# Patient Record
Sex: MALE | Race: BLACK OR AFRICAN AMERICAN | Employment: FULL TIME | URBAN - METROPOLITAN AREA
[De-identification: names, ages, dates, MRNs, and addresses within clinical notes are randomized per-mention and may not be internally consistent; named-entity substitution may affect disease eponyms.]

---

## 2019-06-06 ENCOUNTER — HOSPITAL ENCOUNTER (EMERGENCY)
Age: 39
Discharge: HOME | End: 2019-06-06
Attending: SPECIALIST
Payer: MEDICAID

## 2019-06-06 VITALS
WEIGHT: 315 LBS | DIASTOLIC BLOOD PRESSURE: 98 MMHG | SYSTOLIC BLOOD PRESSURE: 151 MMHG | HEART RATE: 60 BPM | RESPIRATION RATE: 16 BRPM | OXYGEN SATURATION: 100 % | BODY MASS INDEX: 44.1 KG/M2 | TEMPERATURE: 97.7 F | HEIGHT: 71 IN

## 2019-06-06 DIAGNOSIS — L03.316 CELLULITIS, UMBILICAL: Primary | ICD-10-CM

## 2019-06-06 LAB
CHP ED QC CHECK: YES
GLUCOSE BLD-MCNC: 71 MG/DL
GLUCOSE BLD-MCNC: 71 MG/DL (ref 75–110)

## 2019-06-06 PROCEDURE — 6370000000 HC RX 637 (ALT 250 FOR IP): Performed by: PHYSICIAN ASSISTANT

## 2019-06-06 PROCEDURE — 99283 EMERGENCY DEPT VISIT LOW MDM: CPT

## 2019-06-06 PROCEDURE — 6360000002 HC RX W HCPCS: Performed by: PHYSICIAN ASSISTANT

## 2019-06-06 PROCEDURE — 90471 IMMUNIZATION ADMIN: CPT | Performed by: PHYSICIAN ASSISTANT

## 2019-06-06 PROCEDURE — 82947 ASSAY GLUCOSE BLOOD QUANT: CPT

## 2019-06-06 PROCEDURE — 90715 TDAP VACCINE 7 YRS/> IM: CPT | Performed by: PHYSICIAN ASSISTANT

## 2019-06-06 RX ORDER — ACETAMINOPHEN 160 MG
TABLET,DISINTEGRATING ORAL
COMMUNITY

## 2019-06-06 RX ORDER — SULFAMETHOXAZOLE AND TRIMETHOPRIM 800; 160 MG/1; MG/1
1 TABLET ORAL ONCE
Status: COMPLETED | OUTPATIENT
Start: 2019-06-06 | End: 2019-06-06

## 2019-06-06 RX ORDER — ASCORBIC ACID 500 MG
500 TABLET ORAL DAILY
COMMUNITY

## 2019-06-06 RX ORDER — CEPHALEXIN 250 MG/1
500 CAPSULE ORAL ONCE
Status: COMPLETED | OUTPATIENT
Start: 2019-06-06 | End: 2019-06-06

## 2019-06-06 RX ORDER — UBIDECARENONE 75 MG
50 CAPSULE ORAL DAILY
COMMUNITY

## 2019-06-06 RX ORDER — M-VIT,TX,IRON,MINS/CALC/FOLIC 27MG-0.4MG
1 TABLET ORAL DAILY
COMMUNITY

## 2019-06-06 RX ORDER — CEPHALEXIN 500 MG/1
500 CAPSULE ORAL 4 TIMES DAILY
Qty: 28 CAPSULE | Refills: 0 | Status: SHIPPED | OUTPATIENT
Start: 2019-06-06 | End: 2019-06-13

## 2019-06-06 RX ORDER — SULFAMETHOXAZOLE AND TRIMETHOPRIM 800; 160 MG/1; MG/1
1 TABLET ORAL 2 TIMES DAILY
Qty: 14 TABLET | Refills: 0 | Status: SHIPPED | OUTPATIENT
Start: 2019-06-06 | End: 2019-06-13

## 2019-06-06 RX ADMIN — SULFAMETHOXAZOLE AND TRIMETHOPRIM 1 TABLET: 800; 160 TABLET ORAL at 16:02

## 2019-06-06 RX ADMIN — CEPHALEXIN 500 MG: 250 CAPSULE ORAL at 16:02

## 2019-06-06 RX ADMIN — TETANUS TOXOID, REDUCED DIPHTHERIA TOXOID AND ACELLULAR PERTUSSIS VACCINE, ADSORBED 0.5 ML: 5; 2.5; 8; 8; 2.5 SUSPENSION INTRAMUSCULAR at 16:02

## 2019-06-06 NOTE — ED NOTES
Patient cleared for discharge per MD. Patient discharge instructions explained, Rx given and explained to patient. Patient Verbalized understanding of all instructions and all patient questions answered to their satisfaction. Patient departs from ED in stable condition.         Toyin Mittal RN  06/06/19 0905

## 2019-06-06 NOTE — ED PROVIDER NOTES
[facility] ED  [address] [address]  Phone: [phone]  Fax: [phone]      eMERGENCY dEPARTMENT eNCOUnter      Pt Name: Aries Valentin  MRN: [id number]  [place] 1980  Date of evaluation: 6/6/19      CHIEF COMPLAINT:  Chief Complaint   Patient presents with    Bleeding/Bruising     bleeding from umbilicus, ongoing for months       HISTORY OF PRESENT ILLNESS    Aries Valentin is a 42 y.o. male who presents with abscess complaint:     Location/Symptom:  Pain/redness/discharge, umbilitical  Timing/Onset: \"months\"  Context/Setting:  Pt here for worsening of erythema/edema/pain/discharge over the last few weeks and more so the last few days. Most concerned about increasing redness. Never with fevers/chills/GI- function/chest-resp symptoms. Hx of umbilical abscess, has drained earlier 2019 and put on abx. Got better but returned. He reports cycle of increasing symptoms, spontaneous drainage, improvement and then slow recurrence. Last drainage was 2 days ago. Bloody/pus usually found, he reports keeping tissue/gauze in umbilicus. No previous hx of umbilical surgical intervention. Did have Gastric sleeve in 2012 but not through umbilicus. Quality: achy, sharp  Duration: constant  Modifying Factors: worse when pushing on area, no pain otherwise. Severity: moderate    Nursing Notes were reviewed. REVIEW OF SYSTEMS       Constitutional:  Per HPI  Eyes: No visual changes. Neck: No neck pain. Respiratory: Denies recent shortness of breath. Cardiac:  Denies recent chest pain. GI:  Per HPI  Musculoskeletal: Denies focal weakness. Neurologic: Denies headache   Skin:  Per HPI    Negative in 10 essential Systems except as mentioned above and in the HPI. PAST MEDICAL HISTORY   PMH:  has no past medical history on file. Surgical History:  has a past surgical history that includes Sleeve Gastrectomy (01/2017).   Social History:  reports that he has never eating/urinating/stooling. I don't feel there is anything that warrants incision and drainage, I will be treating this patient with Keflex and Bactrim here as well as for home. We'll provide the patient with a surgical follow-up just for management of this. Patient is agreeable to plan and understands my instruction. Tetanus ordered for this patient as well. Patient was given oral antibiotics for bacterial coverage and both verbal and written instructions to follow up to ED or Family Doctor in 2-3 days for recheck. Was instructed to return for any worsening of the abscess or surrounding cellulitis. I have reviewed the disposition diagnosis with the patient and or their family/guardian. I have answered their questions and given discharge instructions. They voiced understanding of these instructions and did not have any further questions or complaints. Giving Surgical f/u, as well as Braydon Pavon f/u as she is accepting new patient. NextDay info provided as well. Orders Placed This Encounter   Medications    cephALEXin (KEFLEX) capsule 500 mg    sulfamethoxazole-trimethoprim (BACTRIM DS;SEPTRA DS) 800-160 MG per tablet 1 tablet    Tetanus-Diphth-Acell Pertussis (BOOSTRIX) injection 0.5 mL    cephALEXin (KEFLEX) 500 MG capsule     Sig: Take 1 capsule by mouth 4 times daily for 7 days     Dispense:  28 capsule     Refill:  0    sulfamethoxazole-trimethoprim (BACTRIM DS) 800-160 MG per tablet     Sig: Take 1 tablet by mouth 2 times daily for 7 days     Dispense:  14 tablet     Refill:  0       CONSULTS:  None      FINAL IMPRESSION      1.  Cellulitis, umbilical          DISPOSITION/PLAN:  DISPOSITION Decision To Discharge 06/06/2019 04:31:14 PM        PATIENT REFERRED TO:  Lucien Laura MD  [address] [facility] [address] [facility]  [phone]    Schedule an appointment as soon as possible for a visit   for re-evaluation of your symptoms    [facility]  [address] [facility]  [phone]  Go to   for worsening of your symptoms    Patito Koenig MD  [facility] [address]  [address]  [phone]      Call for New Patient Scheduling      DISCHARGE MEDICATIONS:  New Prescriptions    CEPHALEXIN (KEFLEX) 500 MG CAPSULE    Take 1 capsule by mouth 4 times daily for 7 days    SULFAMETHOXAZOLE-TRIMETHOPRIM (BACTRIM DS) 800-160 MG PER TABLET    Take 1 tablet by mouth 2 times daily for 7 days       (Please note that portions of this note were completed with a voice recognition program.  Efforts were made to edit the dictations but occasionally words are mis-transcribed.)    THERESE Burns PA-C  06/06/19 0658

## 2019-06-06 NOTE — DISCHARGE INSTR - COC
Continuity of Care Form    Patient Name: Mark Guillaume   :  1980  MRN:  [id number]    Admit date:  2019  Discharge date:  ***    Code Status Order: No Order   Advance Directives:     Admitting Physician:  No admitting provider for patient encounter. PCP: No primary care provider on file. Discharging Nurse: [facility] Unit/Room#: ER05/ER05  Discharging Unit Phone Number: ***    Emergency Contact:   Extended Emergency Contact Information  Primary Emergency Contact: Gregory,Cheryle  Address: [address] [facility] [address]  Home Phone: [phone]  Relation: Parent    Past Surgical History:  Past Surgical History:   Procedure Laterality Date    SLEEVE GASTRECTOMY  2017       Immunization History:   Immunization History   Administered Date(s) Administered    Tdap (Boostrix, Adacel) 2019       Active Problems: There is no problem list on file for this patient.       Isolation/Infection:   Isolation          No Isolation            Nurse Assessment:  Last Vital Signs: BP (!) 151/98   Pulse 60   Temp 97.7 °F (36.5 °C) (Oral)   Resp 16   Ht 5' 11\" (1.803 m)   Wt (!) 182.8 kg (403 lb)   SpO2 100%   BMI 56.21 kg/m²     Last documented pain score (0-10 scale):    Last Weight:   Wt Readings from Last 1 Encounters:   19 (!) 182.8 kg (403 lb)     Mental Status:  {IP PT MENTAL STATUS:87695}    IV Access:  { GWENDOLYN IV ACCESS:099640837}    Nursing Mobility/ADLs:  Walking   {[facility] DME KHBN:337137815}  Transfer  {[facility] DME DKPK:154222110}  Bathing  {[facility] DME UGFZ:302924157}  Dressing  {[facility] DME PDCY:314603041}  Toileting  {[facility] DME UMQC:144270905}  Feeding  {[facility] DME QWVD:263668799}  Med Admin  {[facility] DME FYR}  Med Delivery   { GWENDOLYN MED Delivery:338612920}    Wound Care Documentation and Therapy:        Elimination:  Continence:   · Bowel: {YES / JQ:23321}  · Bladder: {YES / QE:31283}  Urinary Catheter: {Urinary Catheter:435672565}   Colostomy/Ileostomy/Ileal Conduit: {YES / GW:03778}       Date of Last BM: ***  No intake or output data in the 24 hours ending 19 1631  No intake/output data recorded.     Safety Concerns:     [address] GWENDOLYN Safety Concerns:674778195}    Impairments/Disabilities:      [address] [facility] Impairments/Disabilities:270961519}    Nutrition Therapy:  Current Nutrition Therapy:   [address] [facility] Diet List:946441705}    Routes of Feeding: {CHP DME Other Feedings:468009865}  Liquids: {Slp liquid thickness:26133}  Daily Fluid Restriction: {CHP DME Yes amt example:281003281}  Last Modified Barium Swallow with Video (Video Swallowing Test): {Done Not Done BQWD:371085434}    Treatments at the Time of Hospital Discharge:   Respiratory Treatments: ***  Oxygen Therapy:  {Therapy; copd oxygen:35138}  Ventilator:    { CC Vent IGIT:263815023}    Rehab Therapies: {THERAPEUTIC INTERVENTION:5178874125}  Weight Bearing Status/Restrictions: [address] [place] [address] Weight Bearin}  Other Medical Equipment (for information only, NOT a DME order):  {EQUIPMENT:852477975}  Other Treatments: ***    Patient's personal belongings (please select all that are sent with patient):  {[facility] DME Belongings:536398725}    RN SIGNATURE:  {Esignature:911498900}    CASE MANAGEMENT/SOCIAL WORK SECTION    Inpatient Status Date: ***    Readmission Risk Assessment Score:  Readmission Risk              Risk of Unplanned Readmission:        0           Discharging to Facility/ Agency   · Name:   · Address:  · Phone:  · Fax:    Dialysis Facility (if applicable)   · Name:  · Address:  · Dialysis Schedule:  · Phone:  · Fax:    / signature: {Esignature:824237974}    PHYSICIAN SECTION    Prognosis: {Prognosis:6835919400}    Condition at Discharge: [address] Patient Condition:537049615}    Rehab Potential (if transferring to Rehab): {Prognosis:8414437015}    Recommended Labs or Other Treatments After Discharge: ***    Physician Certification: I certify the above information and transfer of Marcellus Arias  is necessary for the continuing treatment of the diagnosis listed and that he requires {Admit to Appropriate Level of Care:67872} for {GREATER/LESS:625399160} 30 days.      Update Admission H&P: {CHP DME Changes in AVOQR:181905349}    PHYSICIAN SIGNATURE:  {Esignature:668351874}

## 2019-06-06 NOTE — ED NOTES
Pt ambulated to room 5. C/o redness around umbilicus with drg from umbilicus. Pt reports this has been ongoing since 2017 when he had an umbilical abscess. Pt sts he just moved home from [facility] and sts redness is increased around umbilicus and increased drg from umbilicus. Pt denies any fevers/chills currently. Pt noted to have redness around umbilicus, also warmth noted around umbilicus. No active drg noted from area at this time. Pt alert and oriented speaking sentences no distress noted.       Teodora Jha RN  06/06/19 6144

## 2019-06-09 NOTE — ED PROVIDER NOTES
Emergency Department     Faculty Attestation    I performed a history and physical examination of the patient and discussed management with the mid level provideer. I reviewed the mid level provider's note and agree with the documented findings and plan of care. Any areas of disagreement are noted on the chart. I was personally present for the key portions of any procedures. I have documented in the chart those procedures where I was not present during the key portions. I have reviewed the emergency nurses triage note. I agree with the chief complaint, past medical history, past surgical history, allergies, medications, social and family history as documented unless otherwise noted below. Documentation of the HPI, Physical Exam and Medical Decision Making performed by medical students or scribes is based on my personal performance of the HPI, PE and MDM. For Physician Assistant/ Nurse Practitioner cases/documentation I have personally evaluated this patient and have completed at least one if not all key elements of the E/M (history, physical exam, and MDM). Additional findings are as noted. Primary Care Physician:  No primary care provider on file. CHIEF COMPLAINT       Chief Complaint   Patient presents with    Bleeding/Bruising     bleeding from umbilicus, ongoing for months       RECENT VITALS:   Temp: 97.7 °F (36.5 °C),  Pulse: 60, Resp: 16, BP: (!) 151/98    LABS:  Labs Reviewed   POC GLUCOSE FINGERSTICK - Abnormal; Notable for the following components:       Result Value    POC Glucose 71 (*)     All other components within normal limits   POCT GLUCOSE - Normal         PERTINENT ATTENDING PHYSICIAN COMMENTS:    72-year-old male patient presents to the emergency department complaining of erythema, edema and discharge from the umbilical area off-and-on for last few weeks and more so over last few days. Patient denies any fever, chills, nausea, vomiting, lightheadedness or dizziness.   Patient has

## 2019-12-03 ENCOUNTER — HOSPITAL ENCOUNTER (EMERGENCY)
Age: 39
Discharge: HOME | End: 2019-12-03
Attending: EMERGENCY MEDICINE
Payer: MEDICAID

## 2019-12-03 VITALS
DIASTOLIC BLOOD PRESSURE: 123 MMHG | HEART RATE: 75 BPM | BODY MASS INDEX: 44.1 KG/M2 | OXYGEN SATURATION: 96 % | SYSTOLIC BLOOD PRESSURE: 166 MMHG | TEMPERATURE: 98.2 F | WEIGHT: 315 LBS | RESPIRATION RATE: 19 BRPM | HEIGHT: 71 IN

## 2019-12-03 DIAGNOSIS — T80.89XA INJECTION SITE IRRITATION, INITIAL ENCOUNTER: Primary | ICD-10-CM

## 2019-12-03 PROCEDURE — 99283 EMERGENCY DEPT VISIT LOW MDM: CPT

## 2019-12-03 PROCEDURE — 6360000002 HC RX W HCPCS: Performed by: EMERGENCY MEDICINE

## 2019-12-03 RX ORDER — DEXAMETHASONE 4 MG/1
10 TABLET ORAL ONCE
Status: COMPLETED | OUTPATIENT
Start: 2019-12-03 | End: 2019-12-03

## 2019-12-03 RX ADMIN — DEXAMETHASONE 10 MG: 4 TABLET ORAL at 03:49

## 2019-12-03 ASSESSMENT — ENCOUNTER SYMPTOMS
VOMITING: 0
ABDOMINAL PAIN: 0
SHORTNESS OF BREATH: 0
SORE THROAT: 0
DIARRHEA: 0
NAUSEA: 0

## 2019-12-03 ASSESSMENT — PAIN DESCRIPTION - ORIENTATION: ORIENTATION: LEFT

## 2019-12-03 ASSESSMENT — PAIN SCALES - GENERAL: PAINLEVEL_OUTOF10: 2

## 2019-12-03 ASSESSMENT — PAIN DESCRIPTION - DESCRIPTORS: DESCRIPTORS: NUMBNESS

## 2019-12-03 ASSESSMENT — PAIN DESCRIPTION - LOCATION: LOCATION: ARM

## 2020-08-25 ENCOUNTER — HOSPITAL ENCOUNTER (OUTPATIENT)
Age: 40
Discharge: HOME | End: 2020-08-25

## 2020-08-25 LAB — RUBV IGG SER QL: 301.3 IU/ML

## 2020-08-25 PROCEDURE — 36415 COLL VENOUS BLD VENIPUNCTURE: CPT

## 2020-08-25 PROCEDURE — 86481 TB AG RESPONSE T-CELL SUSP: CPT

## 2020-08-25 PROCEDURE — 86762 RUBELLA ANTIBODY: CPT

## 2020-08-25 PROCEDURE — 86787 VARICELLA-ZOSTER ANTIBODY: CPT

## 2020-08-25 PROCEDURE — 86735 MUMPS ANTIBODY: CPT

## 2020-08-25 PROCEDURE — 86765 RUBEOLA ANTIBODY: CPT

## 2020-08-26 LAB
MEASLES IMMUNE (IGG): 3.49
MUV IGG SER QL: 3.66
VZV IGG SER QL IA: 2.79

## 2020-08-28 LAB — T-SPOT TB TEST: NORMAL

## 2022-05-17 ENCOUNTER — OFFICE VISIT (OUTPATIENT)
Dept: ORTHOPEDIC SURGERY | Age: 42
End: 2022-05-17
Payer: COMMERCIAL

## 2022-05-17 VITALS — WEIGHT: 315 LBS | BODY MASS INDEX: 46.65 KG/M2 | HEIGHT: 69 IN

## 2022-05-17 DIAGNOSIS — M25.551 BILATERAL HIP PAIN: Primary | ICD-10-CM

## 2022-05-17 DIAGNOSIS — M76.892 TENDINITIS OF BOTH HIPS: Primary | ICD-10-CM

## 2022-05-17 DIAGNOSIS — M76.891 TENDINITIS OF BOTH HIPS: Primary | ICD-10-CM

## 2022-05-17 DIAGNOSIS — M25.552 BILATERAL HIP PAIN: Primary | ICD-10-CM

## 2022-05-17 PROCEDURE — 99203 OFFICE O/P NEW LOW 30 MIN: CPT | Performed by: FAMILY MEDICINE

## 2022-05-17 PROCEDURE — 1036F TOBACCO NON-USER: CPT | Performed by: FAMILY MEDICINE

## 2022-05-17 PROCEDURE — G8417 CALC BMI ABV UP PARAM F/U: HCPCS | Performed by: FAMILY MEDICINE

## 2022-05-17 PROCEDURE — G8427 DOCREV CUR MEDS BY ELIG CLIN: HCPCS | Performed by: FAMILY MEDICINE

## 2022-05-17 NOTE — PROGRESS NOTES
Sports Medicine Consultation     CHIEF COMPLAINT:  Hip Pain (B/L. L>R. left started 5m ago. Right painful with activity. no injury)      HPI:  Misael Bell is a 38y.o. year old male who is a new patient being seen for regarding new problem bilateral hip pain. The pain has been present for 7 month(s). The patient recalls a stretching, walking 5k injury. The patient has tried chiropractor, stretching  without improvement. The pain is described as sharp. There is not pain on weightbearing. There is is not painful popping and clicking. The hip does not catch or lock. It has not given out. It is  stiff upon arising from sitting. It is  painful lying on the affected side. he has no past medical history on file. he has a past surgical history that includes Sleeve Gastrectomy (01/2017). family history is not on file. Social History     Socioeconomic History    Marital status:      Spouse name: Not on file    Number of children: Not on file    Years of education: Not on file    Highest education level: Not on file   Occupational History    Not on file   Tobacco Use    Smoking status: Never Smoker    Smokeless tobacco: Never Used   Substance and Sexual Activity    Alcohol use: No    Drug use: No    Sexual activity: Not on file   Other Topics Concern    Not on file   Social History Narrative    Not on file     Social Determinants of Health     Financial Resource Strain:     Difficulty of Paying Living Expenses: Not on file   Food Insecurity:     Worried About Running Out of Food in the Last Year: Not on file    [place] of Food in the Last Year: Not on file   Transportation Needs:     Lack of Transportation (Medical): Not on file    Lack of Transportation (Non-Medical):  Not on file   Physical Activity:     Days of Exercise per Week: Not on file    Minutes of Exercise per Session: Not on file   Stress:     Feeling of Stress : Not on file   Social Connections:     Frequency of Communication with Friends and Family: Not on file    Frequency of Social Gatherings with Friends and Family: Not on file    Attends Yazidism Services: Not on file    Active Member of Clubs or Organizations: Not on file    Attends Club or Organization Meetings: Not on file    Marital Status: Not on file   Intimate Partner Violence:     Fear of Current or Ex-Partner: Not on file    Emotionally Abused: Not on file    Physically Abused: Not on file    Sexually Abused: Not on file   Housing Stability:     Unable to Pay for Housing in the Last Year: Not on file    Number of [place] in the Last Year: Not on file    Unstable Housing in the Last Year: Not on file       Current Outpatient Medications   Medication Sig Dispense Refill    Multiple Vitamins-Minerals (THERAPEUTIC MULTIVITAMIN-MINERALS) tablet Take 1 tablet by mouth daily      vitamin C (ASCORBIC ACID) 500 MG tablet Take 500 mg by mouth daily      Cholecalciferol (VITAMIN D3) 2000 units CAPS Take by mouth      vitamin B-12 (CYANOCOBALAMIN) 100 MCG tablet Take 50 mcg by mouth daily       No current facility-administered medications for this visit. Allergies:  heis allergic to iv dye [iodides] and shellfish-derived products. ROS:  CV:  Denies chest pain; palpitations; shortness of breath; swelling of feet, ankles; and loss of consciousness. CON: Denies fever and dizziness. ENT:  Denies hearing loss / ringing, ear infections hoarseness, and swallowing problems. RESP:  Denies chronic cough, spitting up blood, and asthma/wheezing. GI: Denies abdominal pain, change in bowel habits, nausea or vomiting, and blood in stools. :  Denies frequent urination, burning or painful urination, blood in the urine, and bladder incontinence. NEURO:  Denies headache, memory loss, sleep disturbance, and tremor or movement disorder.     PHYSICAL EXAM:   Ht 5' 9\" (1.753 m)   Wt (!) 345 lb (156.5 kg)   BMI 50.95 kg/m²   GENERAL: Mango Colón Mira Youngblood is a 38 y.o. male who is alert and oriented and sitting comfortably in our office. SKIN:  Intact without rashes, lesions or ulcerations. NEURO: Sensation to the extremity is intact. VASC:  Capillary refill is less than 3 seconds. Distal pulses are palpable. There is no lymphadenopathy. Hip Exam  Musculoskeletal/Neurologic:  Palpation-Tenderness:mild anterior pain  ROM-Left hip IR 60 degrees, ER 80 degrees  Right hip IR 60 degrees, ER 80 degrees  There is not hip rotational pain  Strength-WNL  Sensation-normal to light touch  TYRONE: negative  FADIR:negative  Nena: negative  Bicycle testing positive  Hip labral stress testing negative  Sensation-normal to light touch    Gait: antalgic    PSYCH:  Good fund of knowledge and displays understanding of exam.    RADIOLOGY: No results found. Radiology:  3 views of the left hip and 2 views of the right hip were ordered, independently visualized by me, and discussed with patient. Findings: Radiographs of bilateral hips failed to demonstrate any significant osseous abnormalities no obvious fractures or dislocations are noted on plain film radiograph of the right or left hip    Impression: No acute osseous abnormalities of both hips    IMPRESSION:     1. Tendinitis of both hips          PLAN:   We discussed some of the etiologies and natural histories of     ICD-10-CM    1. Tendinitis of both hips  M76.891 [facility]    P28.263    . We discussed the various treatment alternatives including anti-inflammatory medications, physical therapy, injections, further imaging studies and as a last resort surgery. At this point I think his issue is functional more tendon related we will treat him conservatively with a course of physical therapy focusing on gait stabilization and strengthening we will see him back otherwise as needed patient voiced understanding agreement this plan    Return to clinic in No follow-ups on file. Ericka Harmon     Please be aware portions of this note were completed using voice recognition software and unforeseen errors may have occurred    Electronically signed by Noe Alpers, DO, FAOASM  on 5/17/22 at 10:39 AM EDT

## 2022-05-19 ENCOUNTER — HOSPITAL ENCOUNTER (OUTPATIENT)
Dept: PHYSICAL THERAPY | Facility: CLINIC | Age: 42
Setting detail: THERAPIES SERIES
Discharge: HOME | End: 2022-05-19

## 2022-05-19 NOTE — FLOWSHEET NOTE
[] [facility] - [facility] &  Therapy  [address]    [phone]  F: [phone]   [] [address]  [facility]   [address]  P: [phone]  F: [phone]  [] [address] &  Therapy  [address]  P: [phone]  F: [phone] [] [address]  P: [phone]  F: [phone]  [x] [address]  [address] [address] [facility] [address]   [address]   [place]: [phone]  F: [phone]   [] [facility]  [address] [facility] [address]  [place]: [phone]   F: [phone]     Physical Therapy Cancel/No Show note    Date: 2022  Patient: Zulma Fernandez  : 1980  MRN: [id number]    Cancels/No Shows to date: 1    For today's appointment patient:    [x]  Cancelled    [] Rescheduled appointment    [] No-show     Reason given by patient:    []  Patient ill    []  Conflicting appointment    [] No transportation      [x] Conflict with work    [] No reason given    [] Weather related    [] COVID-19    [x] Other:      Comments: Pt left a voicemail stating he has to work late & would like to reschedule his appt. Called pt back but there was no answer so I left a message.        [] Next appointment was confirmed    Electronically signed by: Vicki Bobo

## 2022-06-02 ENCOUNTER — HOSPITAL ENCOUNTER (OUTPATIENT)
Dept: PHYSICAL THERAPY | Facility: CLINIC | Age: 42
Setting detail: THERAPIES SERIES
Discharge: HOME | End: 2022-06-02
Payer: COMMERCIAL

## 2022-06-02 NOTE — FLOWSHEET NOTE
[] [facility]) - [facility] &  Therapy  [address]    [phone]  F: [phone]   [] [address]   [address]  P: [phone]  F: [phone]  [] [address] &  Therapy  [address]  P: [phone]  F: [phone] [] [address]  P: [phone]  F: [phone]  [x] [address]  [address] [address] [facility] [address]   [address]   [place]: [phone]  F: [phone]   [] [place] [address] [facility] [address]  [place]: [phone]   F: [phone]     Physical Therapy Cancel/No Show note    Date: 2022  Patient: Dallas Salinas  : 1980  MRN: [id number]    Cancels/No Shows to date: 2    For today's appointment patient:    [x]  Cancelled    [x] Rescheduled appointment 22 @ 3pm    [] No-show     Reason given by patient:    []  Patient ill    []  Conflicting appointment    [] No transportation      [] Conflict with work    [] No reason given    [] Weather related    [] COVID-19    [x] Other:      Comments: Pt called & stated that he was having heart palpitations & was headed to the Er. Pt asked to reschedule his appt.        [x] Next appointment was confirmed    Electronically signed by: Elana Campbell

## 2022-06-07 ENCOUNTER — HOSPITAL ENCOUNTER (OUTPATIENT)
Dept: PHYSICAL THERAPY | Facility: CLINIC | Age: 42
Setting detail: THERAPIES SERIES
Discharge: HOME | End: 2022-06-07
Payer: COMMERCIAL

## 2022-06-07 PROCEDURE — 97110 THERAPEUTIC EXERCISES: CPT

## 2022-06-07 PROCEDURE — 97161 PT EVAL LOW COMPLEX 20 MIN: CPT

## 2022-06-07 PROCEDURE — 97140 MANUAL THERAPY 1/> REGIONS: CPT

## 2022-06-07 NOTE — CONSULTS
[x] [facility]  Outpatient Rehabilitation &  Therapy  [facility]   [place]: [phone]  F: [phone]      Physical Therapy Lower Extremity Evaluation    Date:  2022  Patient: David Brennan  : 1980  MRN: [id number]  Physician: Dr. Jerry Mendiola DO Insurance: [employer] (30 v)  Medical Diagnosis: LBP, hip pain   Rehab Codes: M76.891, M76.892, M62.81, M79.1  Onset date:    Next Dr's appt.:     Subjective:   CC/HPI: Pt is a 37 yo male with bilat hip pain, low back pain that stated in 2022. He feels he may have overdone with a 5K in 2021 and stretching afterwards over the months following. Pain is worse with walking. He has seen multiple chiro with no help. Saw Dr Nate Parmar, sent to PT at this time. XR Bilat hips (-).          PMHx: [] Unremarkable [] Diabetes [] HTN  [] Pacemaker   [] MI/Heart Problems [] Cancer [] Arthritis   [] Other:              [x] Refer to full medical chart  In EPIC     Tests: [] X-Ray: bilat hip (-)     [] MRI:    [] Other:     Comorbidities:   [x] Obesity [] Dialysis  [] N/A   [] Asthma/COPD [] Dementia [] Other:   [] Stroke [] Sleep apnea [] Other:   [] Vascular disease [] Rheumatic disease [] Other:       Medications:  [x] Refer to full medical record [] None [] Other:  Allergies:       [x] Refer to full medical record [] None [] Other:    ADL/IADL [x] Previously independent with all [x] Currently independent with all Who currently assists the patient with task     [] Previously independent with all except: [] Currently independent with all except:     Bathing  [] Assist [] Assist     Dress/grooming [] Assist [] Assist     Transfer/mobility [] Assist [] Assist     Feeding [] Assist [] Assist     Toileting [] Assist [] Assist     Driving [] Assist [] Assist     Housekeeping [] Assist [] Assist     Grocery shop/meal prep [] Assist [] Assist        Employment [employer]   Job status 12 hr shifts   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 yes   Location LLE buttock to knee  RLE anterior hip joint    Pain Rating currently LLE buttock 7/10  RLE anterior hip    Pain at worse LLE 9/10  RLE 9/10   Pain at best LLE 2/10  RLE 0/10   Description of pain sharp   Altered Sensation LLE foot correlated to pain levels present in the LLE hip     What makes it worse LLE buttock to knee worse with sit to stand    RLE with walking    What makes it better Ibuprofen   Laying prone    Sleep Laying prone for comfort            Trunk AROM:  Flex lozano 25%  Ext lozano 50%  R/LSB lozano 25%  R/L Rot lozano 25%      Objective:    STRENGTH    Left Right   Hip Flex 5 5   Ext 4 4   ABD 4 4   ADD 5 5   Knee Flex 5 5   Ext 4+ 4+                OBSERVATION No Deficit Deficit Not Tested Comments   Posture       Forward Head [] [x] []    Rounded Shoulders [] [x] []    Kyphosis [] [x] []    Slumped Sitting [] [x] []    Palpation [] [x] [] LLE post hip/SI pain    Gait [] [x] [] Analysis: Decreased stride length, antalgic gait with LLE WB          FUNCTION Normal Difficult Unable   Bending [] [x] []   Squat [] [x] []        Flexibility Normal Left tight Right tight   Hip flexor [] [x] []   HS [] [x] []   piriformis [] [x] []   gastroc [] [x] []    [] [] []    [] [] []    [] [] []        Functional Test: LEFS Score: 40% functionally impaired       Assessment:    Patient would benefit from skilled physical therapy services in order to improve ROM, flexibility and address left piriformis symptoms and right hip flexor tightness. Problems:    [x] ? Pain  [x] ? ROM  [x] ? Strength  [x] ? Function        Goals  MET NOT MET ON-  GOING  Details   Date Addressed:        STG: To be met in 10 treatments           1. ? Pain: Decrease pain levels to 4/10 with ADLs  []  []  []      2. ? ROM: Increase flexibility and AROM limitations throughout to equal bilat to reduce difficulty with ADLs []  []  []      3. ? Strength: Increase MMT to 5/5 throughout to ease functional limitations and mobility  []  []  []     4. Independent with Home Exercise Programs []  []  []      []  []  []     Date Addressed:        LTG: To be met in 20 treatments       1. Improve score on assessment tool LEFS from 40% impairment to less than 20% impairment  []  []  []     2. Reduce pain levels to 1/10 or less with ADLs []  []  []     3. Patient to tolerate full work day standing with good posture and biomechanics for lifting  []  []  []                Patient goals: find root of pain and eliminate it     Rehab Potential:  [x] Good  [] Fair  [] Poor   Suggested Professional Referral:  [x] No  [] Yes:  Barriers to Goal Achievement[de-identified]  [x] No  [] Yes:  Domestic Concerns:  [x] No  [] Yes:    Pt. Education:  [x] Plans/Goals, Risks/Benefits discussed  [x] Home exercise program    Method of Education: [x] Verbal  [x] Demo  [x] Written  Comprehension of Education:  [x] Verbalizes understanding. [x] Demonstrates understanding. [x] Needs Review. [] Demonstrates/verbalizes understanding of HEP/Ed previously given. Access Code: [email or web address]  URL: [email or web address]  Date: 06/07/2022  Prepared by: Mily Romero    Exercises  Hooklying Hamstring Stretch with Strap - 1 x daily - 7 x weekly - 3 sets - 30 (sec) hold  Long Sitting Calf Stretch with Strap - 1 x daily - 7 x weekly - 3 sets - 30 (sec) hold  Seated Piriformis Stretch - 1 x daily - 7 x weekly - 3 sets - 30 hold  Supine Piriformis Stretch with Leg Straight - 1 x daily - 7 x weekly - 3 sets - 30 (sec) hold  Clamshell - 1 x daily - 7 x weekly - 10 reps - 3 sets  Sidelying Hip Abduction - 1 x daily - 7 x weekly - 10 reps - 3 sets      Treatment Plan:  [x] Therapeutic Exercise    [] Aquatic Therapy   [x] Manual Therapy     [] Electrical Stimulation  [x] Instruction in HEP      [] Lumbar/Cervical Traction  [x] Neuromuscular Re-education [] Cold/hotpack  [] Iontophoresis: 4 mg/mL  [x] Vasocompression (GameReady)                    Dexamethasone Sodium  [x] Gait Training             Phosphate 40-80 mAmin         []  Medication allergies reviewed for use of    Dexamethasone Sodium Phosphate 4mg/ml     with iontophoresis treatments. Pt is not allergic.     Frequency:  2 x/week for 20 visits        Todays Treatment:    Exercise    Bilat hip pain, LBP Reps/ Time Weight/ Level Comments         Bike            Hip Flexor Stretch      Calf Stretch   HEP   HS belt Stretch   HEP   Hip ER Stretch   HEP         Sidelying Hip Abd    HEP   Clamshells   HEP                                                       Myofascial Restrictions  Location  R/L Treatment  Tools Notes   Lower Crossed    [x] Psoas   [] Iliacus [x] Right  [] Left [x] Direct  [] Indirect [x] Hands-On  [] IASTM  [] Hypervolt     [x] Piriformis [] Right  [x] Left [x] Direct  [] Indirect [x] Hands-On  [] IASTM  [] Hypervolt     [x] Coccygeus  [] Levator Ani   [] Right  [x] Left [x] Direct  [] Indirect [x] Hands-On  [] IASTM  [] Hypervolt     [x] Hamstring [] Right  [x] Left [x] Direct  [] Indirect [x] Hands-On  [] IASTM  [] Hypervolt Proximal DI at ischial tuberosity               Direct or Indirect release  IASTM= Instrument assisted soft tissue mobilization        Specific Instructions for next treatment: Advance as tolerated, manual to address tightness       Evaluation Complexity:  History (Personal factors, comorbidities) [x] 0 [] 1-2 [] 3+   Exam (limitations, restrictions) [x] 1-2 [] 3 [] 4+   Clinical presentation (progression) [x] Stable [] Evolving  [] Unstable   Decision Making [x] Low [] Moderate [] High    [x] Low Complexity [] Moderate Complexity [] High Complexity       Treatment Charges: Mins Units   [x] Evaluation       []  Low       []  Moderate       []  High 20 1   []  Modalities     [x]  Ther Exercise 10 1   [x]  Manual Therapy 10 1   []  Ther Activities     []  Aquatics     []  Vasocompression     []  Other       TOTAL TREATMENT TIME: 39    Time in:1500    Time Out:1550      Electronically signed by: Ade Thomas PT        Physician Signature:________________________________Date:__________________  By signing above or cosigning this note, I have reviewed this plan of care and certify a need for medically necessary rehabilitation services.      *PLEASE SIGN ABOVE AND FAX BACK ALL PAGES*

## 2022-06-10 ENCOUNTER — HOSPITAL ENCOUNTER (OUTPATIENT)
Dept: PHYSICAL THERAPY | Facility: CLINIC | Age: 42
Setting detail: THERAPIES SERIES
Discharge: HOME | End: 2022-06-10
Payer: COMMERCIAL

## 2022-06-10 NOTE — FLOWSHEET NOTE
[] [facility]) - [facility] &  Therapy  [address]    [phone]  F: [phone]   [] [address]  [facility]   [address]  P: [phone]  F: [phone]  [] [address] &  Therapy  [address] [facility]  P: [phone]  F: [phone] [] [address]  P: [phone]  F: [phone]  [x] [address]  [address] [facility] [address]   [address]   [place]: [phone]  F: [phone]   [] [address]  [place]: [phone]   F: [phone]     Physical Therapy Cancel/No Show note    Date: 6/10/2022  Patient: Azael Rosen  : 1980  MRN: [id number]    Cancels/No Shows to date: 3/0    For today's appointment patient:    [x]  Cancelled    [] Rescheduled appointment     [] No-show     Reason given by patient:    []  Patient ill    []  Conflicting appointment    [] No transportation      [] Conflict with work    [x] No reason given    [] Weather related    [] [id number]    [] Other:      Comments:       [x] Next appointment was confirmed    Electronically signed by: Arnaldo Lenz